# Patient Record
Sex: MALE | Race: WHITE | NOT HISPANIC OR LATINO
[De-identification: names, ages, dates, MRNs, and addresses within clinical notes are randomized per-mention and may not be internally consistent; named-entity substitution may affect disease eponyms.]

---

## 2022-10-29 ENCOUNTER — APPOINTMENT (OUTPATIENT)
Dept: ORTHOPEDIC SURGERY | Facility: CLINIC | Age: 42
End: 2022-10-29

## 2022-10-29 VITALS — BODY MASS INDEX: 29.62 KG/M2 | WEIGHT: 200 LBS | HEIGHT: 69 IN

## 2022-10-29 DIAGNOSIS — Z78.9 OTHER SPECIFIED HEALTH STATUS: ICD-10-CM

## 2022-10-29 PROBLEM — Z00.00 ENCOUNTER FOR PREVENTIVE HEALTH EXAMINATION: Status: ACTIVE | Noted: 2022-10-29

## 2022-10-29 PROCEDURE — 72170 X-RAY EXAM OF PELVIS: CPT

## 2022-10-29 PROCEDURE — 72100 X-RAY EXAM L-S SPINE 2/3 VWS: CPT

## 2022-10-29 PROCEDURE — 99203 OFFICE O/P NEW LOW 30 MIN: CPT | Mod: 25

## 2022-10-29 RX ORDER — MELOXICAM 15 MG/1
15 TABLET ORAL
Qty: 30 | Refills: 1 | Status: COMPLETED | COMMUNITY
Start: 2022-10-29 | End: 2022-12-28

## 2022-10-29 NOTE — HISTORY OF PRESENT ILLNESS
[Lower back] : lower back [7] : 7 [Dull/Aching] : dull/aching [Radiating] : radiating [Shooting] : shooting [Tingling] : tingling [Constant] : constant [Full time] : Work status: full time [de-identified] : 42M here with 2-3 weeks of left lower back and leg pain. Was lifting heavy furniture. [place] his back go out. He has radiating pain down his left leg. Admits to N/T. No prior L spine issues [] : Post Surgical Visit: no [FreeTextEntry5] : Patient complains of lower back pain since 2 weeks ago\par patient took a side step and felt some sharp pain, pain radiates down the left leg has some numbness and tingling, went to another urgent care and was given muscle relaxers and steroid pack

## 2022-10-29 NOTE — ASSESSMENT
[FreeTextEntry1] : X-Ray Examination of the LUMBAR SPINE 2 views shows: disc space narrowing\par X-Ray Examination of the PELVIS 1 or 2 views shows: there are no fractures, subluxations or dislocations. \par \par Back pain with radiation into lower extremity, pos SLE on exam and weakness\par - We discussed their diagnosis and treatment options at length.\par - We will conservative treatment with a course of anti-inflammatory medication.\par - Discussed the possible side effects of medication along with the timing and frequency for taking.\par - MRI to rule out HNP and other causes of lumbar radiculopathy\par - We also discussed possibility of epidural injection by one of our pain mgmt doctors in the future\par - Follow up after mri\par

## 2022-10-29 NOTE — IMAGING
[de-identified] : Back / Spine:\par Inspection: No erythema and ecchymosis.\par Palpation: left lumbar paraspinal tenderness. \par Range of motion:  Near full range of motion but with mild stiffness. Pain with lateral rotation. pain at extremes of flexion\par Strength Testing: Weakness with left ankle dorsiflexion and EHL strength\par Otherwise 5/5 throughout both lower extremities with normal tone \par Neurological testing: light touch is intact throughout both lower extremities\par Straight Leg Raise: positive left\par Bab[place]i test: neg \par \par

## 2022-11-10 ENCOUNTER — APPOINTMENT (OUTPATIENT)
Dept: PAIN MANAGEMENT | Facility: CLINIC | Age: 42
End: 2022-11-10

## 2022-11-10 ENCOUNTER — APPOINTMENT (OUTPATIENT)
Dept: MRI IMAGING | Facility: CLINIC | Age: 42
End: 2022-11-10

## 2022-11-15 ENCOUNTER — APPOINTMENT (OUTPATIENT)
Dept: PAIN MANAGEMENT | Facility: CLINIC | Age: 42
End: 2022-11-15

## 2022-11-15 VITALS — HEIGHT: 69 IN | BODY MASS INDEX: 29.33 KG/M2 | WEIGHT: 198 LBS

## 2022-11-15 PROCEDURE — 99204 OFFICE O/P NEW MOD 45 MIN: CPT

## 2022-11-15 NOTE — DISCUSSION/SUMMARY
[de-identified] : After discussing various treatment options with the patient including but not limited to oral medications, physical therapy, exercise, as well as interventional spinal injections, we have decided with the following plan:\par \par - Continue home exercises, stretching, activity modification, physical therapy, and conservative care.\par - Will order lumbar MRI to rule out HNP. Please let this note serve as a formal request for authorization to perform a MRI of their Lumbar Spine.\par - Follow-up post diagnostic imaging to review and discuss further treatment.\par - Will provide prescription for Physical Therapy.\par - Recommend Cyclobenzaprine 10mg BID PRN for muscle spasms and to assist with pain relief.\par \par

## 2022-11-15 NOTE — PHYSICAL EXAM

## 2022-11-15 NOTE — HISTORY OF PRESENT ILLNESS
[Lower back] : lower back [5] : 5 [3] : 3 [Radiating] : radiating [Sharp] : sharp [Tingling] : tingling [Constant] : constant [Physical therapy] : physical therapy [Standing] : standing [Walking] : walking [] : no [FreeTextEntry1] : left  [FreeTextEntry6] : numbness [FreeTextEntry7] : left buttock, post thigh to the lower leg and foot

## 2022-12-14 ENCOUNTER — RESULT REVIEW (OUTPATIENT)
Age: 42
End: 2022-12-14

## 2022-12-22 ENCOUNTER — APPOINTMENT (OUTPATIENT)
Dept: PAIN MANAGEMENT | Facility: CLINIC | Age: 42
End: 2022-12-22

## 2022-12-22 VITALS — WEIGHT: 190 LBS | BODY MASS INDEX: 28.14 KG/M2 | HEIGHT: 69 IN

## 2022-12-22 PROCEDURE — 99214 OFFICE O/P EST MOD 30 MIN: CPT

## 2022-12-22 NOTE — PHYSICAL EXAM
[de-identified] : Constitutional; Appears well, no apparent distress\par Ability to communicate: Normal \par Respiratory: non-labored breathing\par Skin: No rash noted\par Head: Normocephalic, atraumatic\par Neck: no visible thyroid enlargement\par Eyes: Extraocular movements intact\par Neurologic: Alert and oriented x3\par Psychiatric: normal mood, affect and behavior \par \par  [] : negative sitting straight leg raise

## 2022-12-22 NOTE — DISCUSSION/SUMMARY
[de-identified] : After discussing various treatment options with the patient including but not limited to oral medications, physical therapy, exercise modalities as well as interventional spinal injections, we have decided with the following plan:\par \par - Continue Home exercises, stretching, activity modification, physical therapy, and conservative care.\par - MRI report and/or images was reviewed and discussed with the patient.\par - Recommend L5-S1 Lumbar Epidural Steroid Injection under fluoroscopic guidance with image.\par - The risks, benefits and alternatives of the proposed procedure were explained in detail with the patient. The risks outlined include but are not limited to infection, bleeding, post-dural puncture headache, nerve injury, a temporary increase in pain, failure to resolve symptoms, allergic reaction, symptom recurrence, and possible elevation of blood sugar in diabetics. All questions were answered to patient's apparent satisfaction and he/she verbalized an understanding.\par - Patient is presenting with acute/sub-acute radicular pain with impairment in ADLs and functionality.  The pain has not responded to conservative care including NSAID therapy and/or physical therapy.  There is no bleeding tendency, unstable medical condition, or systemic infection.\par - Follow up in 1-2 weeks post injection for re-evaluation.\par \par

## 2023-01-03 ENCOUNTER — NON-APPOINTMENT (OUTPATIENT)
Age: 43
End: 2023-01-03

## 2023-01-03 LAB — SARS-COV-2 N GENE NPH QL NAA+PROBE: DETECTED

## 2023-01-25 ENCOUNTER — APPOINTMENT (OUTPATIENT)
Age: 43
End: 2023-01-25

## 2023-02-06 ENCOUNTER — NON-APPOINTMENT (OUTPATIENT)
Age: 43
End: 2023-02-06

## 2023-02-06 ENCOUNTER — APPOINTMENT (OUTPATIENT)
Dept: INTERNAL MEDICINE | Facility: CLINIC | Age: 43
End: 2023-02-06
Payer: COMMERCIAL

## 2023-02-06 VITALS
SYSTOLIC BLOOD PRESSURE: 109 MMHG | TEMPERATURE: 98.3 F | HEIGHT: 69 IN | DIASTOLIC BLOOD PRESSURE: 69 MMHG | HEART RATE: 70 BPM | OXYGEN SATURATION: 97 % | RESPIRATION RATE: 14 BRPM | BODY MASS INDEX: 29.77 KG/M2 | WEIGHT: 201 LBS

## 2023-02-06 DIAGNOSIS — Z84.2 FAMILY HISTORY OF OTHER DISEASES OF THE GENITOURINARY SYSTEM: ICD-10-CM

## 2023-02-06 PROCEDURE — 93000 ELECTROCARDIOGRAM COMPLETE: CPT | Mod: 59

## 2023-02-06 PROCEDURE — 99386 PREV VISIT NEW AGE 40-64: CPT | Mod: 25

## 2023-02-06 PROCEDURE — 36415 COLL VENOUS BLD VENIPUNCTURE: CPT

## 2023-02-06 PROCEDURE — G0444 DEPRESSION SCREEN ANNUAL: CPT | Mod: NC,59

## 2023-02-06 NOTE — HEALTH RISK ASSESSMENT
[Yes] : Yes [1 or 2 (0 pts)] : 1 or 2 (0 points) [Never (0 pts)] : Never (0 points) [No falls in past year] : Patient reported no falls in the past year [Not at All (0)] : 9.) Thoughts that you would be off dead or of hurting yourself in some way? Not at all [PHQ-9 Negative - No further assessment needed] : PHQ-9 Negative - No further assessment needed [HIV test declined] : HIV test declined [Hepatitis C test offered] : Hepatitis C test offered [None] : None [With Family] : lives with family [# of Members in Household ___] :  household currently consist of [unfilled] member(s) [Employed] : employed [] :  [# Of Children ___] : has [unfilled] children [Sexually Active] : sexually active [Feels Safe at Home] : Feels safe at home [Fully functional (bathing, dressing, toileting, transferring, walking, feeding)] : Fully functional (bathing, dressing, toileting, transferring, walking, feeding) [Fully functional (using the telephone, shopping, preparing meals, housekeeping, doing laundry, using] : Fully functional and needs no help or supervision to perform IADLs (using the telephone, shopping, preparing meals, housekeeping, doing laundry, using transportation, managing medications and managing finances) [Reports normal functional visual acuity (ie: able to read med bottle)] : Reports normal functional visual acuity [Smoke Detector] : smoke detector [Carbon Monoxide Detector] : carbon monoxide detector [Safety elements used in home] : safety elements used in home [Seat Belt] :  uses seat belt [Sunscreen] : uses sunscreen [Never] : Never [de-identified] : socially  [[id number]] : 0 [Change in mental status noted] : No change in mental status noted [Language] : denies difficulty with language [Behavior] : denies difficulty with behavior [Learning/Retaining New Information] : denies difficulty learning/retaining new information [Handling Complex Tasks] : denies difficulty handling complex tasks [Reasoning] : denies difficulty with reasoning [Spatial Ability and Orientation] : denies difficulty with spatial ability and orientation [High Risk Behavior] : no high risk behavior [Reports changes in hearing] : Reports no changes in hearing [Reports changes in vision] : Reports no changes in vision [Reports changes in dental health] : Reports no changes in dental health [Guns at Home] : no guns at home [Travel to Developing Areas] : does not  travel to developing areas [TB Exposure] : is not being exposed to tuberculosis [Caregiver Concerns] : does not have caregiver concerns [ColonoscopyComments] : at 44  [FreeTextEntry2] : working in [employer]

## 2023-02-06 NOTE — PHYSICAL EXAM
[No Edema] : there was no peripheral edema [Declined Breast Exam] : declined breast exam  [Declined Rectal Exam] : declined rectal exam [Normal Posterior Cervical Nodes] : no posterior cervical lymphadenopathy [Normal Anterior Cervical Nodes] : no anterior cervical lymphadenopathy [Normal] : affect was normal and insight and judgment were intact [Penis Abnormality] : normal circumcised penis [Scrotum] : the scrotum was normal [Epididymis] : the epididymides were normal [Testes Tenderness] : no tenderness of the testes [Normal Inguinal Nodes] : no inguinal lymphadenopathy [No Joint Swelling] : no joint swelling [Grossly Normal Strength/Tone] : grossly normal strength/tone [de-identified] : straight leg test negative bilaterally

## 2023-02-06 NOTE — HISTORY OF PRESENT ILLNESS
[de-identified] : 43 M here for CPE and establish care\par covid - fully x 1 pifzer\par flu - refused\par tdap - reports got in past 10 years. \par pt denies any family hx of breast, colon, lung cancer or prostate cancer or testicular cancer\par last colonoscopy due at 44\par Mother - ovarian cancer \par pt has no acute complaints\par \par PMHX:\par - chronic back pain - has epidural scheduled - Gray - next monday. \par oct 2022 - had back injury \par LEFT side of the lower back and radiates into the left buttock and down the left post thigh and lower leg to the toes described as a sharp shooting pain with associated numbness and tingling. \par Allergies: NKDA\par Medications: - no medications\par Surgical Hx:- knee L surgery, Meniscal PCL, septum surgery\par Family Hx:\par Mother ovarian cancer\par Maternal Grandfather  CVA\par Maternal Grandmother CVA\par Father - BPH \par Paternal Grandfather Type II DM \par Paternal Grandmother - Dementia\par Siblings:\par 1 brother - healthy \par Social\par ETOH - socially \par Smoker - denies \par Illicit Drug use - denies\par Occupation:  \par

## 2023-02-07 ENCOUNTER — NON-APPOINTMENT (OUTPATIENT)
Age: 43
End: 2023-02-07

## 2023-02-07 LAB
25(OH)D3 SERPL-MCNC: 52.3 NG/ML
ALBUMIN SERPL ELPH-MCNC: 4.6 G/DL
ALP BLD-CCNC: 58 U/L
ALT SERPL-CCNC: 44 U/L
ANION GAP SERPL CALC-SCNC: 12 MMOL/L
APPEARANCE: CLEAR
AST SERPL-CCNC: 32 U/L
BACTERIA: NEGATIVE
BASOPHILS # BLD AUTO: 0.07 K/UL
BASOPHILS NFR BLD AUTO: 0.9 %
BILIRUB SERPL-MCNC: 0.2 MG/DL
BILIRUBIN URINE: NEGATIVE
BLOOD URINE: NEGATIVE
BUN SERPL-MCNC: 18 MG/DL
CALCIUM SERPL-MCNC: 9.8 MG/DL
CHLORIDE SERPL-SCNC: 102 MMOL/L
CHOLEST SERPL-MCNC: 205 MG/DL
CO2 SERPL-SCNC: 27 MMOL/L
COLOR: NORMAL
CREAT SERPL-MCNC: 1.03 MG/DL
EGFR: 92 ML/MIN/1.73M2
EOSINOPHIL # BLD AUTO: 0.16 K/UL
EOSINOPHIL NFR BLD AUTO: 2.1 %
ESTIMATED AVERAGE GLUCOSE: 111 MG/DL
FERRITIN SERPL-MCNC: 208 NG/ML
FOLATE SERPL-MCNC: 12.9 NG/ML
GLUCOSE QUALITATIVE U: NEGATIVE
GLUCOSE SERPL-MCNC: 98 MG/DL
HBA1C MFR BLD HPLC: 5.5 %
HBV SURFACE AB SERPL IA-ACNC: <3 MIU/ML
HCT VFR BLD CALC: 39.9 %
HCV AB SER QL: NONREACTIVE
HCV S/CO RATIO: 0.09 S/CO
HDLC SERPL-MCNC: 61 MG/DL
HGB BLD-MCNC: 13.1 G/DL
HYALINE CASTS: 0 /LPF
IMM GRANULOCYTES NFR BLD AUTO: 0.4 %
IRON SATN MFR SERPL: 31 %
IRON SERPL-MCNC: 98 UG/DL
KETONES URINE: NEGATIVE
LDLC SERPL CALC-MCNC: 125 MG/DL
LEUKOCYTE ESTERASE URINE: NEGATIVE
LYMPHOCYTES # BLD AUTO: 2.26 K/UL
LYMPHOCYTES NFR BLD AUTO: 29.5 %
MAN DIFF?: NORMAL
MCHC RBC-ENTMCNC: 29 PG
MCHC RBC-ENTMCNC: 32.8 GM/DL
MCV RBC AUTO: 88.3 FL
MICROSCOPIC-UA: NORMAL
MONOCYTES # BLD AUTO: 0.59 K/UL
MONOCYTES NFR BLD AUTO: 7.7 %
NEUTROPHILS # BLD AUTO: 4.56 K/UL
NEUTROPHILS NFR BLD AUTO: 59.4 %
NITRITE URINE: NEGATIVE
NONHDLC SERPL-MCNC: 144 MG/DL
PH URINE: 6.5
PLATELET # BLD AUTO: 260 K/UL
POTASSIUM SERPL-SCNC: 4.5 MMOL/L
PROT SERPL-MCNC: 7.5 G/DL
PROTEIN URINE: NEGATIVE
RBC # BLD: 4.52 M/UL
RBC # FLD: 14 %
RED BLOOD CELLS URINE: 1 /HPF
SODIUM SERPL-SCNC: 141 MMOL/L
SPECIFIC GRAVITY URINE: 1.02
SQUAMOUS EPITHELIAL CELLS: 0 /HPF
TIBC SERPL-MCNC: 315 UG/DL
TRIGL SERPL-MCNC: 94 MG/DL
TSH SERPL-ACNC: 3.24 UIU/ML
UIBC SERPL-MCNC: 218 UG/DL
UROBILINOGEN URINE: NORMAL
VIT B12 SERPL-MCNC: 777 PG/ML
WBC # FLD AUTO: 7.67 K/UL
WHITE BLOOD CELLS URINE: 0 /HPF

## 2023-02-13 ENCOUNTER — APPOINTMENT (OUTPATIENT)
Dept: PAIN MANAGEMENT | Facility: CLINIC | Age: 43
End: 2023-02-13
Payer: COMMERCIAL

## 2023-02-13 DIAGNOSIS — M54.17 RADICULOPATHY, LUMBOSACRAL REGION: ICD-10-CM

## 2023-02-13 PROCEDURE — 62323 NJX INTERLAMINAR LMBR/SAC: CPT

## 2023-02-13 NOTE — PROCEDURE
[FreeTextEntry3] : Date of Service: 02/13/2023 \par \par Account: [id number]\par \par Patient: JOSIANE OTT \par \par YOB: 1980\par \par Age: 43 year\par \par \par Surgeon:                                                         Shoaib Gray D.O.\par \par Pre-Operative Diagnosis:                             Lumbosacral radiculitis\par \par Post-Operative Diagnosis:                           Same\par \par Procedure:                                                      Interlaminar lumbar epidural steroid injection (L5-S1) under fluoroscopic guidance\par \par Anesthesia:                                                     Local with MAC\par \par \par This procedure was carried out using fluoroscopic guidance.  The risks and benefits of the procedure were discussed extensively with the patient.  The consent of the patient was obtained and the following procedure was performed.\par \par The patient was placed in the prone position.  The lumbar area was prepped and draped in a sterile fashion.  A timeout was performed with all essential staff present and the site and side were verified. Under AP view with slight cephalad-caudad angulation, the L5-S1 interspace was identified and marked.  Using sterile technique, the superficial skin was anesthetized with 1% Lidocaine without epinephrine.  A 20-gauge Tuohy needle was advanced into the epidural space under fluoroscopy using pyvpu-[id number]-[id number] technique and using loss of resistance at the L5-S1 level.  After negative aspiration for heme or CSF, an epidurogram was obtained using 2-3 cc Omnipaque contrast injected under live fluoroscopy, confirming epidural placement of the needle.  \par \par Epidurogram showed no evidence of intrathecal or intravascular flow, and good evidence of bilateral epidural flow from L3-S2 levels.  After this, 4 cc of preservative free normal saline plus 12 mg of betamethasone were injected into the epidural space.\par \par The needle was subsequently removed.  Anesthesia personnel were present throughout the procedure.\par \par The patient tolerated the procedure well and was instructed to contact me immediately if there were any problems.\par \par Shoaib Gray D.O.\par

## 2023-03-06 ENCOUNTER — APPOINTMENT (OUTPATIENT)
Dept: PAIN MANAGEMENT | Facility: CLINIC | Age: 43
End: 2023-03-06
Payer: COMMERCIAL

## 2023-03-06 DIAGNOSIS — M54.50 LOW BACK PAIN, UNSPECIFIED: ICD-10-CM

## 2023-03-06 DIAGNOSIS — M54.16 RADICULOPATHY, LUMBAR REGION: ICD-10-CM

## 2023-03-06 PROCEDURE — 99213 OFFICE O/P EST LOW 20 MIN: CPT

## 2023-03-06 NOTE — DISCUSSION/SUMMARY
[de-identified] : After discussing various treatment options with the patient including but not limited to oral medications, physical therapy, exercise modalities as well as interventional spinal injections, we have decided with the following plan:\par \par - Continue home exercises, stretching, activity modification, physical therapy, and conservative care.\par - Follow-up as needed.\par - Will provide prescription for Physical Therapy.\par \par

## 2023-03-06 NOTE — PHYSICAL EXAM
[de-identified] : Constitutional; Appears well, no apparent distress\par Ability to communicate: Normal \par Respiratory: non-labored breathing\par Skin: No rash noted\par Head: Normocephalic, atraumatic\par Neck: no visible thyroid enlargement\par Eyes: Extraocular movements intact\par Neurologic: Alert and oriented x3\par Psychiatric: normal mood, affect and behavior \par \par  [] : negative sitting straight leg raise

## 2023-03-06 NOTE — HISTORY OF PRESENT ILLNESS
[Lower back] : lower back [Dull/Aching] : dull/aching [Radiating] : radiating [Intermittent] : intermittent [Rest] : rest [Physical therapy] : physical therapy [Standing] : standing [Walking] : walking [1] : 2 [0] : 0 [Injection therapy] : injection therapy [] : no [FreeTextEntry1] : left

## 2023-03-21 ENCOUNTER — APPOINTMENT (OUTPATIENT)
Dept: INTERNAL MEDICINE | Facility: CLINIC | Age: 43
End: 2023-03-21
Payer: COMMERCIAL

## 2023-03-21 VITALS
OXYGEN SATURATION: 99 % | SYSTOLIC BLOOD PRESSURE: 117 MMHG | RESPIRATION RATE: 15 BRPM | WEIGHT: 200 LBS | HEART RATE: 53 BPM | BODY MASS INDEX: 29.62 KG/M2 | HEIGHT: 69 IN | DIASTOLIC BLOOD PRESSURE: 71 MMHG | TEMPERATURE: 98.5 F

## 2023-03-21 DIAGNOSIS — Z20.818 CONTACT WITH AND (SUSPECTED) EXPOSURE TO OTHER BACTERIAL COMMUNICABLE DISEASES: ICD-10-CM

## 2023-03-21 DIAGNOSIS — J02.9 ACUTE PHARYNGITIS, UNSPECIFIED: ICD-10-CM

## 2023-03-21 LAB — S PYO AG SPEC QL IA: NORMAL

## 2023-03-21 PROCEDURE — 99214 OFFICE O/P EST MOD 30 MIN: CPT | Mod: 25

## 2023-03-21 PROCEDURE — 87880 STREP A ASSAY W/OPTIC: CPT | Mod: QW

## 2023-03-21 NOTE — ASSESSMENT
[FreeTextEntry1] : #Strep Throat exposure\par Tylenol or Ibuprofen is recommended for treatment of fevers, chills, body-aches\par Over the counter decongestants and cough suppressants for mild nasal congestion, rhinorrhea and cough\par Lozenges for sore throat\par Keep well hydrated\par Rest\par POCT strep - negative\par f/u throat culture\par if experience any sore throat, fever, ear pain contact pcp - amoxicllin 500mg bid for 10 day sent.\par pt denies any allergies to penicllin\par \par pt agrees and understands plan via teach back method. all questions answered.\par \par

## 2023-03-21 NOTE — PHYSICAL EXAM
[Normal Outer Ear/Nose] : the outer ears and nose were normal in appearance [Normal Oropharynx] : the oropharynx was normal [No Lymphadenopathy] : no lymphadenopathy [Supple] : supple [Normal Posterior Cervical Nodes] : no posterior cervical lymphadenopathy [Normal Anterior Cervical Nodes] : no anterior cervical lymphadenopathy [Normal] : affect was normal and insight and judgment were intact [de-identified] : no erythema, no tonsillar enlargement bilaterally, no exudate bilaterally, uvula midline. +PND

## 2023-03-21 NOTE — HEALTH RISK ASSESSMENT
[No] : No [No falls in past year] : Patient reported no falls in the past year [0] : 2) Feeling down, depressed, or hopeless: Not at all (0) [PHQ-2 Negative - No further assessment needed] : PHQ-2 Negative - No further assessment needed [Never] : Never [[id number]] : 0

## 2023-03-21 NOTE — HISTORY OF PRESENT ILLNESS
[FreeTextEntry8] : 43 M\par presents with no sx of sore throat, but wife and kids + strep throat and would like to get tested\par No fever\par No cough\par No nasal or sinus congestion\par No ear ache\par No bodyaches or neck stiffness\par Able to tolerate PO\par Denies SOB or wheezing or CP or palpitations\par Denies NVD\par Denies HA\par + covid vaccinated\par denies any recent travel\par denies any recent sick contacts\par denies any loss of taste or smell.\par

## 2023-03-23 ENCOUNTER — NON-APPOINTMENT (OUTPATIENT)
Age: 43
End: 2023-03-23

## 2023-03-23 LAB — BACTERIA THROAT CULT: NORMAL

## 2024-03-14 ENCOUNTER — APPOINTMENT (OUTPATIENT)
Dept: INTERNAL MEDICINE | Facility: CLINIC | Age: 44
End: 2024-03-14
Payer: COMMERCIAL

## 2024-03-14 ENCOUNTER — NON-APPOINTMENT (OUTPATIENT)
Age: 44
End: 2024-03-14

## 2024-03-14 VITALS
SYSTOLIC BLOOD PRESSURE: 135 MMHG | TEMPERATURE: 98.3 F | OXYGEN SATURATION: 100 % | BODY MASS INDEX: 31.25 KG/M2 | HEIGHT: 69 IN | DIASTOLIC BLOOD PRESSURE: 86 MMHG | RESPIRATION RATE: 15 BRPM | HEART RATE: 66 BPM | WEIGHT: 211 LBS

## 2024-03-14 DIAGNOSIS — Z00.00 ENCOUNTER FOR GENERAL ADULT MEDICAL EXAMINATION W/OUT ABNORMAL FINDINGS: ICD-10-CM

## 2024-03-14 DIAGNOSIS — Z29.9 ENCOUNTER FOR PROPHYLACTIC MEASURES, UNSPECIFIED: ICD-10-CM

## 2024-03-14 DIAGNOSIS — M25.562 PAIN IN LEFT KNEE: ICD-10-CM

## 2024-03-14 DIAGNOSIS — Z13.31 ENCOUNTER FOR SCREENING FOR DEPRESSION: ICD-10-CM

## 2024-03-14 DIAGNOSIS — Z13.6 ENCOUNTER FOR SCREENING FOR CARDIOVASCULAR DISORDERS: ICD-10-CM

## 2024-03-14 PROCEDURE — 99396 PREV VISIT EST AGE 40-64: CPT | Mod: 25

## 2024-03-14 PROCEDURE — 93000 ELECTROCARDIOGRAM COMPLETE: CPT

## 2024-03-14 RX ORDER — AMOXICILLIN 500 MG/1
500 CAPSULE ORAL
Qty: 20 | Refills: 0 | Status: COMPLETED | COMMUNITY
Start: 2023-03-21 | End: 2024-03-14

## 2024-03-14 RX ORDER — CYCLOBENZAPRINE HYDROCHLORIDE 10 MG/1
10 TABLET, FILM COATED ORAL TWICE DAILY
Qty: 60 | Refills: 0 | Status: COMPLETED | COMMUNITY
Start: 2022-11-15 | End: 2024-03-14

## 2024-03-14 NOTE — HISTORY OF PRESENT ILLNESS
[de-identified] : 43 M here for CPE and establish care covid - fully x 1 pifzer flu - refused tdap - reports got in past 10 years. pt denies any family hx of breast, colon, lung cancer or prostate cancer or testicular cancer due for colonoscopy PMHX: - chronic back pain - following Dr. Gray  oct 2022 - had back injury - doing well Allergies: NKDA Medications: - none Surgical Hx:- knee L surgery, Meniscal PCL, septum surgery Family Hx: Mother ovarian cancer Maternal Grandfather CVA Maternal Grandmother CVA Father - BPH Paternal Grandfather Type II DM Paternal Grandmother - Dementia Siblings: 1 brother - healthy Social ETOH - socially Smoker - denies Illicit Drug use - denies Occupation:   Pt c/o of L knee pain  was doing [employer] 1 week ago and hurt L knee no fall, however reports twisted it while doing [employer] - c/of mild swelling, using knee brace - improving since last week pt reports pain is 2/10 radiates to no where aggravated by movement  alleviated by rest and knee brace associated with no other sx reports taking aleve and aspirin for sx ice/heat for pain Pt has no further complaints. Pt reports no numbness, tingling, weakness or loss of sensation.

## 2024-03-14 NOTE — PHYSICAL EXAM
[Normal Outer Ear/Nose] : the outer ears and nose were normal in appearance [Normal Oropharynx] : the oropharynx was normal [No Lymphadenopathy] : no lymphadenopathy [Supple] : supple [Normal Posterior Cervical Nodes] : no posterior cervical lymphadenopathy [Normal Anterior Cervical Nodes] : no anterior cervical lymphadenopathy [Normal] : affect was normal and insight and judgment were intact [No Edema] : there was no peripheral edema [Non Tender] : non-tender [Soft] : abdomen soft [Non-distended] : non-distended [Normal Bowel Sounds] : normal bowel sounds [Declined Rectal Exam] : declined rectal exam [No Joint Swelling] : no joint swelling [Grossly Normal Strength/Tone] : grossly normal strength/tone [de-identified] : declined [de-identified] : no effusion, erythema or TTP of L knee. R knee WNL

## 2024-03-14 NOTE — ASSESSMENT
[FreeTextEntry1] : #CPE f/u blood and urine ekg - sinus bradycardia no acute st or t wave changes - interpreted and reviewed results with pt. immunizations - deferred I spent 5 minutes with the patient conducting a screen using approved screening tool (PHQ2) and discussing results of said screen with patient during this encounter. The patient was counseled to get regular exercise and sleep, wear sunblock and wear a safety belt in the car. Check CBC, CMP, Hgba1c , TSH and UA Colonoscopy  Ophtho Derm Dental #L knee pain rest, elevate, ice ibuprofen prn for pain w/ food 600mg tid as needed f/u xray of knee - pt reports will defer xray for now, if get worse will get xray  no improvement f/u orthopedic pt agrees and understands plan via teach back method. all questions answered.

## 2024-03-14 NOTE — HEALTH RISK ASSESSMENT
[No] : No [No falls in past year] : Patient reported no falls in the past year [0] : 2) Feeling down, depressed, or hopeless: Not at all (0) [PHQ-2 Negative - No further assessment needed] : PHQ-2 Negative - No further assessment needed [None] : None [With Family] : lives with family [Employed] : employed [Sexually Active] : sexually active [Fully functional (bathing, dressing, toileting, transferring, walking, feeding)] : Fully functional (bathing, dressing, toileting, transferring, walking, feeding) [Fully functional (using the telephone, shopping, preparing meals, housekeeping, doing laundry, using] : Fully functional and needs no help or supervision to perform IADLs (using the telephone, shopping, preparing meals, housekeeping, doing laundry, using transportation, managing medications and managing finances) [Smoke Detector] : smoke detector [Carbon Monoxide Detector] : carbon monoxide detector [Safety elements used in home] : safety elements used in home [Seat Belt] :  uses seat belt [Sunscreen] : uses sunscreen [Never] : Never [HIV test declined] : HIV test declined [Hepatitis C test declined] : Hepatitis C test declined [] :  [# Of Children ___] : has [unfilled] children [[id number]] : 0 [Change in mental status noted] : No change in mental status noted [Language] : denies difficulty with language [Behavior] : denies difficulty with behavior [Learning/Retaining New Information] : denies difficulty learning/retaining new information [Handling Complex Tasks] : denies difficulty handling complex tasks [Reasoning] : denies difficulty with reasoning [Spatial Ability and Orientation] : denies difficulty with spatial ability and orientation [High Risk Behavior] : no high risk behavior [Reports changes in vision] : Reports no changes in vision [Reports changes in hearing] : Reports no changes in hearing [Reports changes in dental health] : Reports no changes in dental health [Reports normal functional visual acuity (ie: able to read med bottle)] : Reports poor functional visual acuity.  [Guns at Home] : no guns at home [TB Exposure] : is not being exposed to tuberculosis [Travel to Developing Areas] : does not  travel to developing areas [Caregiver Concerns] : does not have caregiver concerns

## 2024-03-19 ENCOUNTER — NON-APPOINTMENT (OUTPATIENT)
Age: 44
End: 2024-03-19

## 2024-03-19 LAB
25(OH)D3 SERPL-MCNC: 46.2 NG/ML
ALBUMIN SERPL ELPH-MCNC: 4.5 G/DL
ALP BLD-CCNC: 55 U/L
ALT SERPL-CCNC: 42 U/L
ANION GAP SERPL CALC-SCNC: 9 MMOL/L
APPEARANCE: CLEAR
AST SERPL-CCNC: 27 U/L
BACTERIA: NEGATIVE /HPF
BILIRUB SERPL-MCNC: 0.4 MG/DL
BILIRUBIN URINE: NEGATIVE
BLOOD URINE: NEGATIVE
BUN SERPL-MCNC: 25 MG/DL
CALCIUM SERPL-MCNC: 9.8 MG/DL
CAST: 0 /LPF
CHLORIDE SERPL-SCNC: 104 MMOL/L
CHOLEST SERPL-MCNC: 207 MG/DL
CO2 SERPL-SCNC: 29 MMOL/L
COLOR: YELLOW
CREAT SERPL-MCNC: 1.15 MG/DL
EGFR: 80 ML/MIN/1.73M2
EPITHELIAL CELLS: 0 /HPF
ESTIMATED AVERAGE GLUCOSE: 114 MG/DL
FOLATE SERPL-MCNC: 9.4 NG/ML
GLUCOSE QUALITATIVE U: NEGATIVE MG/DL
GLUCOSE SERPL-MCNC: 102 MG/DL
HBA1C MFR BLD HPLC: 5.6 %
HCT VFR BLD CALC: 43.3 %
HDLC SERPL-MCNC: 53 MG/DL
HGB BLD-MCNC: 13.6 G/DL
IRON SATN MFR SERPL: 32 %
IRON SERPL-MCNC: 95 UG/DL
KETONES URINE: NEGATIVE MG/DL
LDLC SERPL CALC-MCNC: 138 MG/DL
LEUKOCYTE ESTERASE URINE: NEGATIVE
MCHC RBC-ENTMCNC: 27.4 PG
MCHC RBC-ENTMCNC: 31.4 GM/DL
MCV RBC AUTO: 87.1 FL
MICROSCOPIC-UA: NORMAL
NITRITE URINE: NEGATIVE
NONHDLC SERPL-MCNC: 154 MG/DL
PH URINE: 6
PLATELET # BLD AUTO: 239 K/UL
POTASSIUM SERPL-SCNC: 5.1 MMOL/L
PROT SERPL-MCNC: 7.7 G/DL
PROTEIN URINE: NEGATIVE MG/DL
RBC # BLD: 4.97 M/UL
RBC # FLD: 14.5 %
RED BLOOD CELLS URINE: 0 /HPF
SODIUM SERPL-SCNC: 142 MMOL/L
SPECIFIC GRAVITY URINE: 1.02
TIBC SERPL-MCNC: 298 UG/DL
TRIGL SERPL-MCNC: 89 MG/DL
TSH SERPL-ACNC: 2.7 UIU/ML
UIBC SERPL-MCNC: 202 UG/DL
UROBILINOGEN URINE: 0.2 MG/DL
VIT B12 SERPL-MCNC: 740 PG/ML
WBC # FLD AUTO: 4.54 K/UL
WHITE BLOOD CELLS URINE: 0 /HPF

## 2024-10-15 ENCOUNTER — APPOINTMENT (RX ONLY)
Dept: URBAN - METROPOLITAN AREA CLINIC 329 | Facility: CLINIC | Age: 44
Setting detail: DERMATOLOGY
End: 2024-10-15

## 2024-10-15 DIAGNOSIS — D22 MELANOCYTIC NEVI: ICD-10-CM

## 2024-10-15 DIAGNOSIS — L82.1 OTHER SEBORRHEIC KERATOSIS: ICD-10-CM

## 2024-10-15 DIAGNOSIS — D18.0 HEMANGIOMA: ICD-10-CM

## 2024-10-15 DIAGNOSIS — L81.4 OTHER MELANIN HYPERPIGMENTATION: ICD-10-CM

## 2024-10-15 DIAGNOSIS — D17 BENIGN LIPOMATOUS NEOPLASM: ICD-10-CM

## 2024-10-15 DIAGNOSIS — L85.3 XEROSIS CUTIS: ICD-10-CM

## 2024-10-15 PROBLEM — D17.1 BENIGN LIPOMATOUS NEOPLASM OF SKIN AND SUBCUTANEOUS TISSUE OF TRUNK: Status: ACTIVE | Noted: 2024-10-15

## 2024-10-15 PROBLEM — D22.5 MELANOCYTIC NEVI OF TRUNK: Status: ACTIVE | Noted: 2024-10-15

## 2024-10-15 PROBLEM — D18.01 HEMANGIOMA OF SKIN AND SUBCUTANEOUS TISSUE: Status: ACTIVE | Noted: 2024-10-15

## 2024-10-15 PROCEDURE — ? SUNSCREEN RECOMMENDATIONS

## 2024-10-15 PROCEDURE — ? COUNSELING

## 2024-10-15 PROCEDURE — 99203 OFFICE O/P NEW LOW 30 MIN: CPT

## 2024-10-15 PROCEDURE — ? ADDITIONAL NOTES

## 2024-10-15 ASSESSMENT — LOCATION DETAILED DESCRIPTION DERM
LOCATION DETAILED: SUPERIOR THORACIC SPINE
LOCATION DETAILED: RIGHT MEDIAL BREAST 1-2:00 REGION
LOCATION DETAILED: RIGHT SUPERIOR UPPER BACK
LOCATION DETAILED: RIGHT SUPERIOR MEDIAL UPPER BACK
LOCATION DETAILED: EPIGASTRIC SKIN
LOCATION DETAILED: LEFT SUPERIOR MEDIAL LOWER BACK
LOCATION DETAILED: INFERIOR THORACIC SPINE

## 2024-10-15 ASSESSMENT — LOCATION SIMPLE DESCRIPTION DERM
LOCATION SIMPLE: ABDOMEN
LOCATION SIMPLE: LEFT LOWER BACK
LOCATION SIMPLE: RIGHT BREAST
LOCATION SIMPLE: UPPER BACK
LOCATION SIMPLE: RIGHT UPPER BACK

## 2024-10-15 ASSESSMENT — LOCATION ZONE DERM: LOCATION ZONE: TRUNK

## 2024-10-15 NOTE — HPI: EVALUATION OF SKIN LESION(S)
Hpi Title: Evaluation of Skin Lesions
How Severe Are Your Spot(S)?: mild
Have Your Spot(S) Been Treated In The Past?: has not been treated
Additional History: Pt has lipomas on his back that are enlarging

## 2024-10-15 NOTE — PROCEDURE: ADDITIONAL NOTES
Detail Level: Simple
Additional Notes: Recommended seeing general surgery for removal due to how deep it is
Render Risk Assessment In Note?: no